# Patient Record
Sex: FEMALE | Race: WHITE | Employment: UNEMPLOYED | URBAN - METROPOLITAN AREA
[De-identification: names, ages, dates, MRNs, and addresses within clinical notes are randomized per-mention and may not be internally consistent; named-entity substitution may affect disease eponyms.]

---

## 2023-10-12 ENCOUNTER — HOSPITAL ENCOUNTER (EMERGENCY)
Facility: HOSPITAL | Age: 43
Discharge: HOME | End: 2023-10-12
Payer: COMMERCIAL

## 2023-10-12 ENCOUNTER — PHARMACY VISIT (OUTPATIENT)
Dept: PHARMACY | Facility: CLINIC | Age: 43
End: 2023-10-12
Payer: MEDICAID

## 2023-10-12 VITALS
DIASTOLIC BLOOD PRESSURE: 90 MMHG | WEIGHT: 125 LBS | RESPIRATION RATE: 16 BRPM | BODY MASS INDEX: 19.62 KG/M2 | OXYGEN SATURATION: 99 % | SYSTOLIC BLOOD PRESSURE: 155 MMHG | TEMPERATURE: 97.1 F | HEIGHT: 67 IN | HEART RATE: 99 BPM

## 2023-10-12 DIAGNOSIS — T19.2XXA VAGINAL FOREIGN BODY, INITIAL ENCOUNTER: Primary | ICD-10-CM

## 2023-10-12 PROCEDURE — RXMED WILLOW AMBULATORY MEDICATION CHARGE

## 2023-10-12 PROCEDURE — 99283 EMERGENCY DEPT VISIT LOW MDM: CPT

## 2023-10-12 RX ORDER — SULFAMETHOXAZOLE AND TRIMETHOPRIM 800; 160 MG/1; MG/1
1 TABLET ORAL EVERY 12 HOURS
Qty: 20 TABLET | Refills: 0 | Status: SHIPPED | OUTPATIENT
Start: 2023-10-12 | End: 2023-10-22

## 2023-10-12 ASSESSMENT — PAIN - FUNCTIONAL ASSESSMENT: PAIN_FUNCTIONAL_ASSESSMENT: 0-10

## 2023-10-12 ASSESSMENT — PAIN SCALES - GENERAL: PAINLEVEL_OUTOF10: 0 - NO PAIN

## 2023-10-12 NOTE — ED PROVIDER NOTES
Chief Complaint   Patient presents with    Foreign Body in Vagina     States possibly since saturday       43-year-old female arrives to the emergency department with a chief complaint of feeling that she forgot a tampon approximately 10 days ago.  Patient states that since that time she has had an extremely foul odor with grayish-black creamy drainage continually.  The patient has no new sexual partner, no concern for STD.  Patient has no difficulty urinating.  Patient denies any fevers or chills, the patient is anxious upon arrival.  Patient denies any significant medical history and does not take any medications daily.           PmHx, PsHx, Allergies, Family Hx, social Hx reviewed as documented    A complete 10 point review of systems was performed and is negative except for as mentioned in the HPI.    Physical Exam:    General: Patient is AAOx3, appears well developed, well nourished, is a good historian, answers questions appropriately    HEENT: head normocephalic, atraumatic, PERRLA, EOMs intact, oropharynx without erythema or exudate, buccal mucosa intact without lesions, TMs unremarkable, nose is patent bilateral    Neck: supple, full ROM, negative for lymphadenopathy, JVD, thyromegaly, tracheal deviation, nuccal rigidity    Pulmonary: CTAB, no accessory muscle use, able to speak full clear sentences    Cardiac: HRRR, no murmurs, rubs or gallops    GI: soft, non-tender, non-distended, BS + x 4, no masses or organomegaly, no guarding or CVA tenderness noted, negative retana's, mcburney's    Musculoskeletal: full weight bearing, SANCHEZ, no joint effusions, clubbing or edema noted    Skin: intact, no lesions or rashes noted, turgor is good.    Neuro: patient follow commands, cranial nerves 2-12 grossly intact, motor strengths 5/5 upper and lower extremities, DTR's and sensation are symmetrical. No focal deficits.    Rectal/: No urinary burning, urgency, change in frequency.  Patient has no rectal complaints.   On chaperoned pelvic exam, deep into the patient's vagina, as visualized with the speculum, there was an aged tampon, the string was facing in.         Medical Decision Making  This patient was seen in the emergency department with an attending physician available at all times throughout their ED course    Primary consideration for the patient would be a vaginal foreign body in the way of the tampon versus an STD, bacterial vaginosis.     Pelvic Exam:   On a chaperoned pelvic exam, the patient was draped and placed on a bedpan, patient was able to assume the position without difficulty.  With the speculum, and aged tampon was visualized with the string facing inward, it was gray in color and deteriorating, there was a foul odor, I was able to reach the tampon with forceps, when it was pulled out a gush of purulent drainage came with it.  I irrigated the exterior vaginal canal with saline cleaning with swabs.  Patient tolerated well.    Prophylactically, the patient will be treated for a likely Pseudomonas infection with Bactrim DS, twice a day for 10 days given her first dose here in the emergency department, medications are filled via meds to beds    Further, the patient will do over-the-counter douching for the next 2 days    Patient will follow-up with her OB/GYN with any further symptoms or complaints.  The patient was slightly hypertensive as well as tachycardic upon arrival, however this is most likely due to the patient anxiety, after the completion of the exam, the patient's vital signs improved spontaneously.    Patient's ED course is most consistent with a vaginal foreign body       Diagnoses as of 10/12/23 1755   Vaginal foreign body, initial encounter       The patient has had the following imaging during this ER visit: VITAL SIGNS     Patient History   No past medical history on file.  Past Surgical History:   Procedure Laterality Date    TONSILLECTOMY  04/22/2014    Tonsillectomy    TUBAL LIGATION   "04/22/2014    Tubal Ligation     No family history on file.  Social History     Tobacco Use    Smoking status: Not on file    Smokeless tobacco: Not on file   Substance Use Topics    Alcohol use: Not on file    Drug use: Not on file       ED Triage Vitals [10/12/23 1434]   Temp Heart Rate Resp BP   36.2 °C (97.1 °F) (!) 112 18 (!) 175/118      SpO2 Temp src Heart Rate Source Patient Position   100 % -- -- --      BP Location FiO2 (%)     -- --       Vitals:    10/12/23 1434   BP: (!) 175/118   Pulse: (!) 112   Resp: 18   Temp: 36.2 °C (97.1 °F)   SpO2: 100%   Weight: 56.7 kg (125 lb)   Height: 1.702 m (5' 7\")               Mark Soto, APRN-CNP  10/12/23 1928    "